# Patient Record
(demographics unavailable — no encounter records)

---

## 2017-02-06 NOTE — EMERGENCY ROOM REPORT
History of Present Illness


General


Chief Complaint:  Female Urogenital Problems


Source:  Patient





Present Illness


HPI


31 YOF with 2 days vaginal discharge, white in color.  Denies dysuria, polyuria

, fever/chills, abd pain, flank pain.  Had chlamdyia years ago.  Faithful with 

1 partner.  Denies frequent UTIs.


Allergies:  


Coded Allergies:  


     No Known Allergies (Unverified , 5/17/15)





Patient History


Past Medical History:  none


Past Surgical History:  none


Pertinent Family History:  none


Social History:  Denies: alcohol use, drug use, smoking


Last Menstrual Period:  01/30/2017


Pregnant Now:  No


Immunizations:  UTD


Reviewed Nursing Documentation:  PMH: Agreed, PSxH: Agreed





Nursing Documentation-PMH


Past Medical History:  No Stated History





Review of Systems


All Other Systems:  negative except mentioned in HPI





Physical Exam





Vital Signs








  Date Time  Temp Pulse Resp B/P Pulse Ox O2 Delivery O2 Flow Rate FiO2


 


2/6/17 08:05 98.2 66 14 110/57 99 Room Air  








Sp02 EP Interpretation:  reviewed, normal


General Appearance:  normal inspection, well appearing, no apparent distress, 

alert


Head:  atraumatic


ENT:  normal ENT inspection, hearing grossly normal, normal voice


Neck:  normal inspection, full range of motion, supple, no bony tend


Respiratory:  normal inspection, lungs clear, normal breath sounds, no 

respiratory distress, no retraction, no wheezing


Cardiovascular #1:  regular rate, rhythm, no edema


Gastrointestinal:  normal inspection, normal bowel sounds, non tender, soft, no 

guarding, no hernia


Genitourinary:  no CVA tenderness


Musculoskeletal:  normal inspection, back normal, normal range of motion, Kyle'

s Sign negative


Neurologic:  normal inspection, alert, oriented x3, responsive, CNs III-XII nml 

as tested, motor strength/tone normal, speech normal


Psychiatric:  normal inspection, judgement/insight normal, mood/affect normal


Skin:  normal inspection, normal color, no rash





Medical Decision Making


Diagnostic Impression:  


 Primary Impression:  


 Trichomonal cervicitis


ER Course


Urine preg negative


UA + for trich.  No UTI


Patient tx with flagyl 2g here as per Uptodate recommendation


Rx for flagyl for 7 days given for her to give to partner


Strongly advised avoidance of ETOH during this week of tx





Last Vital Signs








  Date Time  Temp Pulse Resp B/P Pulse Ox O2 Delivery O2 Flow Rate FiO2


 


2/6/17 08:05 98.2 66 14 110/57 99 Room Air  








Status:  improved


Disposition:  HOME, SELF-CARE


Scripts


Metronidazole* (FLAGYL*) 500 Mg Tablet


500 MG ORAL BID for 7 Days, #14 TAB


   Prov: CHRISTOPHER DENG M.D.         2/6/17











CHRISTOPHER DENG M.D. Feb 6, 2017 08:26

## 2017-07-05 NOTE — EMERGENCY ROOM REPORT
History of Present Illness


General


Chief Complaint:  Back Pain-No Injury


Source:  Patient





Present Illness


HPI


Is a 31-year-old female with no cerebrovascular issue.  She present which 

complaining of thoracic back pain.  This occurred about 3 weeks ago.  She was 

behind someone in line and she has he sneezed really badly.  She tried to 

stifle it.  When she did sneezed she felt pain to her left/mid upper back.  She 

said brought her to her knee.  Denies any fever chills denies any nausea 

vomiting.  Since then she been having some intermittent pain.  Especially when 

she takes a deep breath or movement.  No fever or chills.  No nausea no 

vomiting.  For some reason his been worse in the last few days.  Pain is 8/10.  

Better with ibuprofen.


Allergies:  


Coded Allergies:  


     No Known Allergies (Unverified , 5/17/15)





Patient History


Past Medical History:  see triage record, old chart reviewed


Past Surgical History:  other


Pertinent Family History:  none


Social History:  Denies: smoking


Last Menstrual Period:  2017


Pregnant Now:  No


:  2


Para:  2


Immunizations:  other


Reviewed Nursing Documentation:  PMH: Agreed, PSxH: Agreed





Nursing Documentation-PMH


Past Medical History:  No Stated History





Review of Systems


Eye:  Denies: blurred vision, eye pain


ENT:  Denies: ear pain, nose congestion, throat swelling


Respiratory:  Denies: cough, shortness of breath


Cardiovascular:  Denies: chest pain, palpitations


Gastrointestinal:  Denies: abdominal pain, diarrhea, nausea, vomiting


Musculoskeletal:  Reports: back pain, Denies: joint pain


Skin:  Denies: rash


Neurological:  Denies: headache, numbness


Endocrine:  Denies: increased thirst, increased urine


Hematologic/Lymphatic:  Denies: easy bruising


All Other Systems:  negative except mentioned in HPI





Physical Exam





Vital Signs








  Date Time  Temp Pulse Resp B/P Pulse Ox O2 Delivery O2 Flow Rate FiO2


 


17 22:45 98.2 78 18 144/90 100 Room Air  





vitals unremarkable


Sp02 EP Interpretation:  reviewed, normal


General Appearance:  well appearing, no apparent distress, alert


Head:  normocephalic, atraumatic


Eyes:  bilateral eye EOMI, bilateral eye PERRL


ENT:  hearing grossly normal, normal pharynx


Neck:  full range of motion, supple, no meningismus


Respiratory:  chest non-tender, lungs clear, normal breath sounds


Cardiovascular #1:  regular rate, rhythm, no murmur


Gastrointestinal:  normal bowel sounds, non tender, no mass, no organomegaly, 

no bruit, non-distended


Musculoskeletal:  back normal - Tender to palpation over the medial aspect of 

the left scapula area.  No deformity.  No foreign body., gait/station normal, 

normal range of motion


Neurologic:  alert, oriented x3


Psychiatric:  mood/affect normal


Skin:  warm/dry





Medical Decision Making


Diagnostic Impression:  


 Primary Impression:  


 Strain of thoracic region


 Qualified Codes:  S29.019A - Strain of muscle and tendon of unspecified wall 

of thorax, initial encounter


ER Course


Patient with back strain/muscle strain from sneezing.  No evidence of 

pneumonia.  No evidence of ACS, PE, dissection to name a few.  We'll discharge 

home.


Chest X-Ray Diagnostic Results


Chest X-Ray Diagnostic Results :  


   Chest X-Ray Ordered:  Yes


   # of Views/Limited/Complete:  1 View


   Indication:  Chest Pain


   EP Interpretation:  Yes


   Interpretation:  no consolidation, no effusion, no pneumothorax, no acute 

cardiopulmonary disease


   Impression:  No acute disease


   Interpreting ER Provider:  Electronically signed by Victor Hugo Grewal MD





Last Vital Signs








  Date Time  Temp Pulse Resp B/P Pulse Ox O2 Delivery O2 Flow Rate FiO2


 


17 22:45 98.2 78 18 144/90 100 Room Air  








Status:  improved


Disposition:  HOME, SELF-CARE


Condition:  Stable


Scripts


Ibuprofen* (MOTRIN*) 600 Mg Tablet


600 MG ORAL Q8H Y for For Pain, #30 TAB 0 Refills


   Prov: VICTOR HUGO GREWAL M.D.         17


Patient Instructions:  Back Pain, Adult





Additional Instructions:  


No heavy lifting.  Followup with your DrJahaira in 3-7 days as needed.  Return if 

symptom worsen.











VICTOR UHGO GREWAL M.D. 2017 23:04

## 2017-07-06 NOTE — DIAGNOSTIC IMAGING REPORT
Indication: Dyspnea



Comparison:  None



A single view chest radiograph was obtained.



Findings:



Cardiomediastinal appearance is within normal limits for age.  Pulmonary vascularity

is appropriate. The diaphragmatic contour is smooth and costophrenic angles are

sharp. No pleural effusions are identified. There is a scoliosis of the thoracic

spine.



Impression: No acute findings

## 2017-09-01 NOTE — EMERGENCY ROOM REPORT
History of Present Illness


General


Chief Complaint:  Neck Pain


Source:  Patient





Present Illness


HPI


Is a 32-year-old female with no past medical history.  She presents with 

complaint of neck pain.  Onset for last 2 days.  He said it hurts her to 

swallow.  Her to hold her head up.  Denies any fever or chills he denies any 

nausea vomiting.  She felt like there is a swollen area to the neck.  Denies 

any other complaint.  No trauma.  No hoarseness of her voice.  No weight loss.


Allergies:  


Coded Allergies:  


     No Known Allergies (Unverified , 5/17/15)





Patient History


Past Medical History:  see triage record, old chart reviewed


Past Surgical History:  none


Pertinent Family History:  none


Social History:  Denies: smoking


Pregnant Now:  No


Immunizations:  other


Reviewed Nursing Documentation:  PMH: Agreed, PSxH: Agreed





Nursing Documentation-PMH


Past Medical History:  No Stated History





Review of Systems


Eye:  Denies: eye pain, blurred vision


ENT:  Denies: ear pain, nose congestion, throat swelling


Respiratory:  Denies: cough, shortness of breath


Cardiovascular:  Denies: chest pain, palpitations


Gastrointestinal:  Denies: abdominal pain, diarrhea, nausea, vomiting


Musculoskeletal:  Denies: back pain, joint pain


Skin:  Denies: rash


Neurological:  Denies: headache, numbness


Endocrine:  Denies: increased thirst, increased urine


Hematologic/Lymphatic:  Denies: easy bruising


All Other Systems:  negative except mentioned in HPI





Physical Exam





Vital Signs








  Date Time  Temp Pulse Resp B/P (MAP) Pulse Ox O2 Delivery O2 Flow Rate FiO2


 


9/1/17 00:39 98.2 76 20 112/68 97 Room Air  





vitals normal


Sp02 EP Interpretation:  reviewed, normal


General Appearance:  well appearing, no apparent distress, alert


Head:  normocephalic, atraumatic


Eyes:  bilateral eye PERRL, bilateral eye EOMI


ENT:  hearing grossly normal, normal pharynx


Neck:  full range of motion, supple, no meningismus, tender - Patient has a 

mildly enlarged thyroid.  No bruit.  No redness or warmth.


Respiratory:  chest non-tender, lungs clear, normal breath sounds


Cardiovascular #1:  regular rate, rhythm, no murmur


Gastrointestinal:  normal bowel sounds, non tender, no mass, no organomegaly, 

no bruit, non-distended


Musculoskeletal:  back normal, gait/station normal, normal range of motion


Psychiatric:  mood/affect normal


Skin:  warm/dry





Medical Decision Making


Diagnostic Impression:  


 Primary Impression:  


 Neck pain


 Additional Impression:  


 Goiter, euthyroid


ER Course


Patient presents with neck pain.  This is similar symptoms she had when I saw 

her 2 years ago.  It then her labs were normal.  She was laying on the bed with 

her phone was any difficulty.  Now she said that she came to lift up her neck.  

She walked in here without a problem.  No neck issue then.  I explained to the 

patient that she need to followup with her primary care DrJahaira and get workup via 

ultrasound of her thyroid.  There is nothing we can do for her in the ER.  

Nothing that urgent care can do.  She did not believe me and got mad and walked 

out.





I see no evidence of respiratory issue.  No evidence of mass effect. no 

evidence of thyroid toxicity. I offered her number to the different clinics in 

the area but she refused.





Last Vital Signs








  Date Time  Temp Pulse Resp B/P (MAP) Pulse Ox O2 Delivery O2 Flow Rate FiO2


 


9/1/17 00:39 98.2 76 20 112/68 97 Room Air  








Status:  improved


Disposition:  HOME, SELF-CARE


Condition:  Stable


Referrals:  


NOT CHOSEN [facility]/MD,REFERRING (PCP)


Patient Instructions:  NECK PAIN, No Trauma





Additional Instructions:  


Followup with your DrJahaira in 7 days.  You would need an ultrasound of your 

thyroid.  Return if symptom worsen.











JAMES SOLOMON M.D. Sep 1, 2017 01:22

## 2019-07-25 NOTE — NUR
ED Nurse Note:

Patient walked into ED c/o lower abdominal pain that she rates a 9/10 pain. at 
time of arrival patient reports no episodes of vomiting, states that shes just 
experiencing pain. patient is alert and oriented x4, ambulatory with a steady 
gait, VSS

## 2019-07-25 NOTE — EMERGENCY ROOM REPORT
History of Present Illness


General


Chief Complaint:  Abdominal Pain


Source:  Patient





Present Illness


HPI


This is a 33-year-old female with no past medical history.  She presents with 

chief complaint of abdominal pain.  Onset yesterday.  Worsened today.  Pain is 

to the right flank area rating down the right lower quadrant area.  Has a 

couple episode of nausea and vomiting today.  Normal appetite for dinner.  No 

diarrhea.  No fever chills.  Pain is sharp and crampy.  8 out of 10.  Denies 

any trauma.  Nothing made it better.  Palpation made it worse.  No urinary 

complaint.


Allergies:  


Coded Allergies:  


     No Known Allergies (Unverified , 5/17/15)





Patient History


Past Medical History:  see triage record, old chart reviewed


Past Surgical History:  none


Pertinent Family History:  none


Social History:  Denies: smoking


Last Menstrual Period:  7/4/19


Pregnant Now:  No


Reviewed Nursing Documentation:  PMH: Agreed; PSxH: Agreed





Nursing Documentation-PMH


Past Medical History:  No Stated History





Review of Systems


Eye:  Denies: eye pain, blurred vision


ENT:  Denies: ear pain, nose congestion, throat swelling


Respiratory:  Denies: cough, shortness of breath


Cardiovascular:  Denies: chest pain, palpitations


Gastrointestinal:  Reports: abdominal pain, nausea, vomiting; Denies: diarrhea


Musculoskeletal:  Denies: back pain, joint pain


Skin:  Denies: rash


Neurological:  Denies: headache, numbness


Endocrine:  Denies: increased thirst, increased urine


Hematologic/Lymphatic:  Denies: easy bruising


All Other Systems:  negative except mentioned in HPI





Physical Exam





Vital Signs








  Date Time  Temp Pulse Resp B/P (MAP) Pulse Ox O2 Delivery O2 Flow Rate FiO2


 


7/25/19 22:04 98.4 71 18 109/65 (80) 96 Room Air  





Vitals normal


Sp02 EP Interpretation:  reviewed, normal


General Appearance:  well appearing, no apparent distress, alert


Head:  normocephalic, atraumatic


Eyes:  bilateral eye PERRL, bilateral eye EOMI


ENT:  hearing grossly normal, normal pharynx


Neck:  full range of motion, supple, no meningismus


Respiratory:  chest non-tender, lungs clear, normal breath sounds


Cardiovascular #1:  regular rate, rhythm, no murmur


Gastrointestinal:  normal bowel sounds, non tender, no mass, no organomegaly, 

no bruit, non-distended, tenderness - Right lower quadrant


Musculoskeletal:  back normal, gait/station normal, normal range of motion


Psychiatric:  mood/affect normal





Medical Decision Making


Diagnostic Impression:  


 Primary Impression:  


 Cholelithiasis


 Qualified Codes:  K80.20 - Calculus of gallbladder without cholecystitis 

without obstruction


 Additional Impression:  


 UTI (urinary tract infection)


 Qualified Codes:  N30.00 - Acute cystitis without hematuria


ER Course


Patient presents with right-sided abdominal pain.  This probably secondary to 

cholelithiasis and biliary colic.  No evidence of cholecystitis or obstruction.

  Pain is well controlled now.  Her elevated liver enzymes probably secondary 

to inflammatory process that has now resolved.  No evidence of any obstruction.

  Patient does have a urinary tract infection.  Antibiotics given here.  No 

evidence of an acute abdomen.  Will discharge home.


CT/MRI/US Diagnostic Results


CT/MRI/US Diagnostic Results :  


   Imaging Test Ordered:  CT Abdomen and pelvis


   Impression


Gallstones without cholecystitis per radiologist





Last Vital Signs








  Date Time  Temp Pulse Resp B/P (MAP) Pulse Ox O2 Delivery O2 Flow Rate FiO2


 


7/25/19 22:04 98.4 71 18 109/65 (80) 96 Room Air  








Status:  improved


Disposition:  HOME, SELF-CARE


Condition:  Stable


Scripts


Nitrofurantoin Monohyd/M-Cryst (Nitrofurantoin Mono-Mcr 100 mg) 100 Mg Capsule


100 MG ORAL Q12H, #14 CAP


   Prov: Victor Hugo Grewal MD         7/26/19 


Ibuprofen* (MOTRIN*) 600 Mg Tablet


600 MG ORAL THREE TIMES A DAY, #30 TAB 0 Refills


   Prov: Victor Hugo Grewal MD         7/26/19 


Hydrocodone/Acetaminophen 5-325* (HYDROCODONE/ACETAMINOPHEN 5-325*) 1 Each 

Tablet


1 TAB ORAL Q6H PRN for For Pain, #15 TAB 0 Refills


   Prov: Victor Hugo Grewal MD         7/26/19





Additional Instructions:  


Follow-up with your doctor in 7 days.  If continue with pain especially after 

eating, may need referral to see surgeon for possible surgery.  Return if 

symptoms worsen.











Victor Hugo Grewal MD Jul 25, 2019 22:20

## 2019-07-25 NOTE — DIAGNOSTIC IMAGING REPORT
Indication: Abdominal pain since yesterday, worsening today, right flank area

radiating down the right lower quadrant

 

Technique: Spiral acquisitions obtained through the abdomen and pelvis. No oral

contrast utilized, per emergency room physician request No IV contrast utilized,  per

referring physician request.. Multiplanar reconstructions were generated. Total dose

length product 877.61 mGycm. CTDIvol(s) 17.49 mGy. Dose reduction achieved using

automated exposure control

 

 

Comparison: None

 

Findings: The appendix is normal. There is a small amount of free pelvic fluid. No

evidence of diverticulosis or diverticulitis. No small bowel distention. The distal

esophagus, stomach, duodenum are unremarkable.

 

Lack of IV contrast limits assessment of solid organs. The gallbladder contains

gallstones. The liver, bile ducts, pancreas, spleen, adrenals, kidneys are

unremarkable. No ureteral calculi, hydronephrosis, or hydroureter. The uterus

contains an intrauterine device. No pelvic mass or adenopathy.

 

The included lung bases demonstrate minimal atelectatic changes on the left. The

bones are unremarkable.

 

Impression: No acute abnormality

 

Trace free pelvic fluid, presumably physiologic

 

Cholelithiasis. No evidence of acute cholecystitis

 

Intrauterine device, basilar pulmonary dependent atelectatic changes incidentally

noted

 

 

 

 

 

The CT scanner at [facility] is accredited by the [employer] College of

Radiology and the scans are performed using protocols designed to limit radiation

exposure to as low as reasonably achievable to attain images of sufficient resolution

adequate for diagnostic evaluation.